# Patient Record
Sex: MALE | Race: ASIAN | NOT HISPANIC OR LATINO
[De-identification: names, ages, dates, MRNs, and addresses within clinical notes are randomized per-mention and may not be internally consistent; named-entity substitution may affect disease eponyms.]

---

## 2019-02-15 ENCOUNTER — TRANSCRIPTION ENCOUNTER (OUTPATIENT)
Age: 39
End: 2019-02-15

## 2019-02-20 ENCOUNTER — TRANSCRIPTION ENCOUNTER (OUTPATIENT)
Age: 39
End: 2019-02-20

## 2019-05-08 ENCOUNTER — TRANSCRIPTION ENCOUNTER (OUTPATIENT)
Age: 39
End: 2019-05-08

## 2019-11-30 ENCOUNTER — TRANSCRIPTION ENCOUNTER (OUTPATIENT)
Age: 39
End: 2019-11-30

## 2020-03-30 ENCOUNTER — TRANSCRIPTION ENCOUNTER (OUTPATIENT)
Age: 40
End: 2020-03-30

## 2022-03-09 ENCOUNTER — TRANSCRIPTION ENCOUNTER (OUTPATIENT)
Age: 42
End: 2022-03-09

## 2022-05-19 ENCOUNTER — NON-APPOINTMENT (OUTPATIENT)
Age: 42
End: 2022-05-19

## 2022-05-20 ENCOUNTER — EMERGENCY (EMERGENCY)
Facility: HOSPITAL | Age: 42
LOS: 1 days | Discharge: HOME | End: 2022-05-20
Attending: EMERGENCY MEDICINE | Admitting: EMERGENCY MEDICINE
Payer: MEDICAID

## 2022-05-20 VITALS
HEART RATE: 76 BPM | OXYGEN SATURATION: 98 % | WEIGHT: 220.02 LBS | SYSTOLIC BLOOD PRESSURE: 134 MMHG | RESPIRATION RATE: 16 BRPM | DIASTOLIC BLOOD PRESSURE: 89 MMHG | TEMPERATURE: 98 F

## 2022-05-20 LAB — S PYO DNA THROAT QL NAA+PROBE: SIGNIFICANT CHANGE UP

## 2022-05-20 PROCEDURE — 99283 EMERGENCY DEPT VISIT LOW MDM: CPT | Mod: 25

## 2022-05-20 PROCEDURE — 71046 X-RAY EXAM CHEST 2 VIEWS: CPT

## 2022-05-20 PROCEDURE — 87798 DETECT AGENT NOS DNA AMP: CPT

## 2022-05-20 PROCEDURE — 87651 STREP A DNA AMP PROBE: CPT

## 2022-05-20 PROCEDURE — 99284 EMERGENCY DEPT VISIT MOD MDM: CPT

## 2022-05-20 PROCEDURE — 71046 X-RAY EXAM CHEST 2 VIEWS: CPT | Mod: 26

## 2022-05-20 PROCEDURE — 96372 THER/PROPH/DIAG INJ SC/IM: CPT

## 2022-05-20 RX ORDER — ALBUTEROL 90 UG/1
2 AEROSOL, METERED ORAL
Qty: 1 | Refills: 0
Start: 2022-05-20

## 2022-05-20 RX ADMIN — Medication 125 MILLIGRAM(S): at 20:41

## 2022-05-20 NOTE — ED ADULT NURSE NOTE - CHIEF COMPLAINT QUOTE
c/o cough off and on for 3 months and for the past 5 days he's been having left ear pain and difficulty hearing

## 2022-05-20 NOTE — ED PROVIDER NOTE - CLINICAL SUMMARY MEDICAL DECISION MAKING FREE TEXT BOX
pt with c/o interment cough x 3 months on antibiotics with some improvement.  pt c/o b/l ear pain and wheezing.  meds, XR

## 2022-05-20 NOTE — ED PROVIDER NOTE - NS ED ATTENDING STATEMENT MOD
This was a shared visit with the MARTINA. I reviewed and verified the documentation and independently performed the documented:

## 2022-05-20 NOTE — ED PROVIDER NOTE - NSFOLLOWUPINSTRUCTIONS_ED_ALL_ED_FT
Otitis Media, Adult       Otitis media is a condition in which the middle ear is red and swollen (inflamed) and full of fluid. The middle ear is the part of the ear that contains bones for hearing as well as air that helps send sounds to the brain. The condition usually goes away on its own.      What are the causes?    This condition is caused by a blockage in the eustachian tube. The eustachian tube connects the middle ear to the back of the nose. It normally allows air into the middle ear. The blockage is caused by fluid or swelling. Problems that can cause blockage include:  •A cold or infection that affects the nose, mouth, or throat.      •Allergies.      •An irritant, such as tobacco smoke.      •Adenoids that have become large. The adenoids are soft tissue located in the back of the throat, behind the nose and the roof of the mouth.      •Growth or swelling in the upper part of the throat, just behind the nose (nasopharynx).      •Damage to the ear caused by change in pressure. This is called barotrauma.        What are the signs or symptoms?    Symptoms of this condition include:  •Ear pain.      •Fever.      •Problems with hearing.      •Being tired.      •Fluid leaking from the ear.      •Ringing in the ear.        How is this treated?    This condition can go away on its own within 3–5 days. But if the condition is caused by bacteria or does not go away on its own, or if it keeps coming back, your doctor may:  •Give you antibiotic medicines.      •Give you medicines for pain.        Follow these instructions at home:    •Take over-the-counter and prescription medicines only as told by your doctor.      •If you were prescribed an antibiotic medicine, take it as told by your doctor. Do not stop taking the antibiotic even if you start to feel better.      •Keep all follow-up visits as told by your doctor. This is important.        Contact a doctor if:    •You have bleeding from your nose.      •There is a lump on your neck.      •You are not feeling better in 5 days.      •You feel worse instead of better.        Get help right away if:    •You have pain that is not helped with medicine.      •You have swelling, redness, or pain around your ear.      •You get a stiff neck.      •You cannot move part of your face (paralysis).      •You notice that the bone behind your ear hurts when you touch it.      •You get a very bad headache.        Summary    •Otitis media means that the middle ear is red, swollen, and full of fluid.      •This condition usually goes away on its own.       •If the problem does not go away, treatment may be needed. You may be given medicines to treat the infection or to treat your pain.      •If you were prescribed an antibiotic medicine, take it as told by your doctor. Do not stop taking the antibiotic even if you start to feel better.      •Keep all follow-up visits as told by your doctor. This is important.      This information is not intended to replace advice given to you by your health care provider. Make sure you discuss any questions you have with your health care provider.

## 2022-05-20 NOTE — ED PROVIDER NOTE - OBJECTIVE STATEMENT
41 m occasional smoker presents for cough and b/l ear pain. Pt states he's been coughing on and off for the past 3 months. Was treated with albuterol and amox by his PCP 1 month ago.  Reports mild improvement at the time, with recurrence over the past 5 days.  Notes now persistent cough, productive of yellow sputum.  C/o pain to both ears, left > right, with swelling to the left side of his face and congestion in his ears.  C/o hearing his voice "echo inside my ears." Notes mild pain with swallowing.  Denies fever, trismus, sick contacts.

## 2022-05-20 NOTE — ED PROVIDER NOTE - PATIENT PORTAL LINK FT
You can access the FollowMyHealth Patient Portal offered by [facility] by registering at the following website: [email or web address]. By joining [employer]’s FollowMyHealth portal, you will also be able to view your health information using other applications (apps) compatible with our system.

## 2022-05-23 PROBLEM — Z78.9 OTHER SPECIFIED HEALTH STATUS: Chronic | Status: ACTIVE | Noted: 2022-05-20

## 2022-05-24 ENCOUNTER — APPOINTMENT (OUTPATIENT)
Dept: OTOLARYNGOLOGY | Facility: CLINIC | Age: 42
End: 2022-05-24
Payer: MEDICAID

## 2022-05-24 VITALS
SYSTOLIC BLOOD PRESSURE: 121 MMHG | HEART RATE: 77 BPM | DIASTOLIC BLOOD PRESSURE: 78 MMHG | WEIGHT: 225 LBS | BODY MASS INDEX: 33.33 KG/M2 | HEIGHT: 69 IN

## 2022-05-24 DIAGNOSIS — J06.9 ACUTE UPPER RESPIRATORY INFECTION, UNSPECIFIED: ICD-10-CM

## 2022-05-24 DIAGNOSIS — H91.90 UNSPECIFIED HEARING LOSS, UNSPECIFIED EAR: ICD-10-CM

## 2022-05-24 PROCEDURE — 92557 COMPREHENSIVE HEARING TEST: CPT

## 2022-05-24 PROCEDURE — 92550 TYMPANOMETRY & REFLEX THRESH: CPT

## 2022-05-24 PROCEDURE — 99203 OFFICE O/P NEW LOW 30 MIN: CPT

## 2022-05-24 RX ORDER — AZITHROMYCIN 250 MG/1
250 TABLET, FILM COATED ORAL
Qty: 1 | Refills: 2 | Status: ACTIVE | COMMUNITY
Start: 2022-05-24 | End: 1900-01-01

## 2022-05-24 RX ORDER — METHYLPREDNISOLONE 4 MG/1
4 TABLET ORAL
Qty: 1 | Refills: 0 | Status: ACTIVE | COMMUNITY
Start: 2022-05-24 | End: 1900-01-01

## 2022-05-24 NOTE — ASSESSMENT
[FreeTextEntry1] :  MILD CONDUCTIVE TRAVIS\par OME\par Z PACK\par MEDROL DOSE PACK\par ADVISED STRONGLY TO SEE HIS PMD FOR COUGH \par PULMONARY REFERRAL\par ADVISED TO REPEAT COVID PCR TEST\par F/U 2 WEEKS

## 2022-05-24 NOTE — HISTORY OF PRESENT ILLNESS
[de-identified] : HEARING CHANGED FOR THE PAST 7 DAYS\par BOTH EARS\par COUGH AND WHEEZING\par MEDICAL HX REVIEWED\par SAYS HAS BEEN TESTED FOR COVID AND IS NEGATIVE\par

## 2022-05-24 NOTE — DATA REVIEWED
[de-identified] : \par -TYMPS: TYPE As (ETF ABNORMAL) AD, TYPE As/ C AS\par -AD: HEARING -3000 HZ, ESSENTIALLY MILD SNHL 6980-8890 HZ\par -AS: ESSENTIALLY SLIGHT SLOPING TO A MILD- MODERATE -8000 HZ \par RECS: 1) ENT FU 2)RE-EVAL IN CONJUNCTION W/ MEDICAL MANAGEMENT

## 2022-05-24 NOTE — REASON FOR VISIT
[Initial Consultation] : an initial consultation for [FreeTextEntry2] : Hearing loss/ear pain/Breathing issues

## 2022-05-24 NOTE — REVIEW OF SYSTEMS
[Hearing Loss] : hearing loss [Problem Snoring] : problem snoring [Chest Pain] : chest pain [Wheezing] : wheezing [Cough] : cough [Negative] : Heme/Lymph [Patient Intake Form Reviewed] : Patient intake form was reviewed [FreeTextEntry6] : noisy breathing [FreeTextEntry1] : headaches

## 2022-05-24 NOTE — PHYSICAL EXAM
[Normal] : mucosa is normal [Midline] : trachea located in midline position [de-identified] : TRAVIS AIR ENTRY/ MANNY

## 2022-05-27 ENCOUNTER — APPOINTMENT (OUTPATIENT)
Dept: PULMONOLOGY | Facility: CLINIC | Age: 42
End: 2022-05-27
Payer: MEDICAID

## 2022-05-27 VITALS
HEART RATE: 83 BPM | HEIGHT: 69 IN | BODY MASS INDEX: 33.33 KG/M2 | OXYGEN SATURATION: 95 % | DIASTOLIC BLOOD PRESSURE: 76 MMHG | WEIGHT: 225 LBS | SYSTOLIC BLOOD PRESSURE: 108 MMHG

## 2022-05-27 DIAGNOSIS — K21.9 GASTRO-ESOPHAGEAL REFLUX DISEASE W/OUT ESOPHAGITIS: ICD-10-CM

## 2022-05-27 DIAGNOSIS — R05.9 COUGH, UNSPECIFIED: ICD-10-CM

## 2022-05-27 PROCEDURE — 94729 DIFFUSING CAPACITY: CPT

## 2022-05-27 PROCEDURE — 99214 OFFICE O/P EST MOD 30 MIN: CPT | Mod: 25

## 2022-05-27 PROCEDURE — 94010 BREATHING CAPACITY TEST: CPT

## 2022-05-27 PROCEDURE — 94726 PLETHYSMOGRAPHY LUNG VOLUMES: CPT

## 2022-05-27 PROCEDURE — ZZZZZ: CPT

## 2022-05-27 RX ORDER — OMEPRAZOLE 40 MG/1
40 CAPSULE, DELAYED RELEASE ORAL
Qty: 30 | Refills: 1 | Status: ACTIVE | COMMUNITY
Start: 2022-05-27 | End: 1900-01-01

## 2022-05-27 NOTE — HISTORY OF PRESENT ILLNESS
[Never] : never [TextBox_4] : 41M\par \par cough for 6 mo\par recently told he has sinus infection put on abx/steroids\par still feeling sob, coughing\par put on steroids again \par no obvious trigger to cough or dyspnea\par feeling fatigued from cough/dyspnea\par no hx of asthma\par mother has asthma\par

## 2022-05-27 NOTE — PROCEDURE
[FreeTextEntry1] : PFT: moderate obstruction.  Lung volumes reduced. DLCO mildly reduced.\par \par \par \par Reviewed:\par \par ACC: [id number] EXAM: XR CHEST PA LAT 2V\par \par PROCEDURE DATE: 05/20/2022\par \par INTERPRETATION: PA and lateral chest radiographs\par \par COMPARISON: 5/20/2022 chest.\par \par CLINICAL INFORMATION: Cough.\par \par FINDINGS:\par \par PULMONARY: The airway is midline.\par There are no airspace consolidations.\par No pleural effusion or pneumothorax.\par \par HEART/VASCULAR: The heart size and mediastinum configuration are within the limits of normal.\par \par BONES: The visualized osseus structures are intact.\par \par IMPRESSION:\par \par No radiographic evidence of active chest disease..\par \par --- End of Report ---\par \par \par \par PREETI FRAUSTO MD; Attending Radiologist\par This document has been electronically signed. May 21 2022 9:37AM

## 2022-05-27 NOTE — ASSESSMENT
[FreeTextEntry1] : asthma?\par trial of trelegy 200 daily\par complete medrol pack\par add PPI for likely gerd exacerbating cough from frequent steroids\par fu 1 week and will repeat sapna/reassess\par \par Total encounter time 30 minutes.\par

## 2022-05-27 NOTE — CONSULT LETTER
[FreeTextEntry1] : Dear  ,\par \par I had the pleasure of evaluating your patient, CHAPARRO SPENCE today in pulmonary consultation.  Please refer to my attached note for my findings and recommendations.\par \par \par Thank you for allowing me to participate in the care of your patient, please feel free to call with any questions or concerns.\par \par \par Sincerely,\par \par Lolis Velazco MD\par [facility] \par [facility]\par \par

## 2022-08-08 ENCOUNTER — NON-APPOINTMENT (OUTPATIENT)
Age: 42
End: 2022-08-08

## 2022-09-09 ENCOUNTER — APPOINTMENT (OUTPATIENT)
Dept: ORTHOPEDIC SURGERY | Facility: CLINIC | Age: 42
End: 2022-09-09

## 2025-04-19 ENCOUNTER — NON-APPOINTMENT (OUTPATIENT)
Age: 45
End: 2025-04-19